# Patient Record
(demographics unavailable — no encounter records)

---

## 2025-01-31 NOTE — REASON FOR VISIT
[Follow-Up Visit] : a follow-up visit for [Neutropenia] : neutropenia [Mother] : mother [Family Member] : family member [Medical Records] : medical records

## 2025-02-02 NOTE — PHYSICAL EXAM
[Tonsils Hypertrophic] : tonsils hypertrophic [No focal deficits] : no focal deficits [Normal] : affect appropriate [de-identified] : wearing glasses [de-identified] : brisk CR, 2+ peripheral puleses [100: Fully active, normal.] : 100: Fully active, normal.

## 2025-02-02 NOTE — CONSULT LETTER
[Dear  ___] : Dear ~HORTENSIA, [Courtesy Letter:] : I had the pleasure of seeing your patient, [unfilled], in my office today. [Please see my note below.] : Please see my note below. [Consult Closing:] : Thank you very much for allowing me to participate in the care of this patient.  If you have any questions, please do not hesitate to contact me. [Sincerely,] : Sincerely, [FreeTextEntry2] : Dania Haddad NP [address] [place] [id number] Phone: [phone] Fax: [phone] [FreeTextEntry3] : Meenakshi Mirza MD, MPH, FAAP Attending Physician [facility] Hematology /Oncology and Cellular Therapy  of Pediatrics [employer] at [facility]

## 2025-02-02 NOTE — CONSULT LETTER
[Dear  ___] : Dear ~HORTENSIA, [Courtesy Letter:] : I had the pleasure of seeing your patient, [unfilled], in my office today. [Please see my note below.] : Please see my note below. [Consult Closing:] : Thank you very much for allowing me to participate in the care of this patient.  If you have any questions, please do not hesitate to contact me. [Sincerely,] : Sincerely, [FreeTextEntry2] : Dania Haddad NP [address] [place] [address] Phone: [phone] Fax: [phone] [FreeTextEntry3] : Meenakshi Mirza MD, MPH, FAAP Attending Physician [facility] Hematology /Oncology and Cellular Therapy  of Pediatrics [employer] at [facility]

## 2025-02-02 NOTE — HISTORY OF PRESENT ILLNESS
[No Feeding Issues] : no feeding issues at this time [de-identified] : We met Don as a 4 year old male with no significant PMH who was found to have neutropenia on a routine CBC on August, 17th 2015. His total white blood cell count (WBC) was 4,300/uL, and absolute neutrophil count (ANC) was 37 /uL indicating profound neutropenia. A repeat CBC on August, 21st Showed similar results. On his presentation to our hematology clinic, his CBC was consistent with neutropenia but the rest of the CBC verna including hemoglobin, platelet count, and reticulocyte percentage were all within normal limits.  Don's mother denied any recent symptoms suggesting a viral infection. She also denied any severe illnesses including pneumonias, meningitis, recurrent skin infections, abscesses or hospitalizations requiring intravenous antibiotics that usually seen with patient with congenital neutropenia. He has had a normal appetite, activity level and has been growing well with appropriate development for age. 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 Good respond to Neupogen with ANC up from 0 to 2500/uL after 1 dose once admitted for febrile neutropenia.  Lost to f/u 3/2017 to 7/2018. Neutropenia recurred in 8/2022 and was severe, prompting work up including gene testing for ELANE which was normal. 1/31/2023: [employer] additional testing results was positive for some VUSs, two of them being LYST genes which are associated with Chediak Higashi.  Also had a gene which causes gallstones (ABCG8). Had a febrile illness 7/15/2023 after returning to [place]. Taken to local ED which did not give empiric antibiotics. Mom therefore brought him back to our ED on 7/16/2023. BCx sent, given empiric Ceftriaxone.  RVP was positive for Rhino/Enterovirus. ANC was 5000 with 16% bands. Throat culture revealed Strep.  Completed a course of Amoxicillin. Mom thinks the elevated ANC was due to the Protandim she administered (gave 2 full tabs, usually takes half a tab).  Of note, Don's aunt (mom's sister) has a P53 mutation and his cousin had Toro Sarcoma.  Don's mom's P53 testing is negative.  [de-identified] : ED visit for fever, found to have Mycoplasma Pneumonia 11/2024. Otherwise, no other illness. No admissions since last visit. No recent cold sores. Continues to take Protandim daily supplement.  Takes 1 tab daily.  Has antioxidants.   Also takes Axio drink supplement and Ambrotose nutritional supplements. Also takes a daily elderberry and vitamin c supplement.  Snoring improved, per mom.  School is going well, 5th grade, no concerns.

## 2025-02-02 NOTE — HISTORY OF PRESENT ILLNESS
[No Feeding Issues] : no feeding issues at this time [de-identified] : We met Don as a 4 year old male with no significant PMH who was found to have neutropenia on a routine CBC on August, 17th 2015. His total white blood cell count (WBC) was 4,300/uL, and absolute neutrophil count (ANC) was 37 /uL indicating profound neutropenia. A repeat CBC on August, 21st Showed similar results. On his presentation to our hematology clinic, his CBC was consistent with neutropenia but the rest of the CBC verna including hemoglobin, platelet count, and reticulocyte percentage were all within normal limits.  Don's mother denied any recent symptoms suggesting a viral infection. She also denied any severe illnesses including pneumonias, meningitis, recurrent skin infections, abscesses or hospitalizations requiring intravenous antibiotics that usually seen with patient with congenital neutropenia. He has had a normal appetite, activity level and has been growing well with appropriate development for age. 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 Good respond to Neupogen with ANC up from 0 to 2500/uL after 1 dose once admitted for febrile neutropenia.  Lost to f/u 3/2017 to 7/2018. Neutropenia recurred in 8/2022 and was severe, prompting work up including gene testing for ELANE which was normal. 1/31/2023: [employer] additional testing results was positive for some VUSs, two of them being LYST genes which are associated with Chediak Higashi.  Also had a gene which causes gallstones (ABCG8). Had a febrile illness 7/15/2023 after returning to [place]. Taken to local ED which did not give empiric antibiotics. Mom therefore brought him back to our ED on 7/16/2023. BCx sent, given empiric Ceftriaxone.  RVP was positive for Rhino/Enterovirus. ANC was 5000 with 16% bands. Throat culture revealed Strep.  Completed a course of Amoxicillin. Mom thinks the elevated ANC was due to the Protandim she administered (gave 2 full tabs, usually takes half a tab).  Of note, Don's aunt (mom's sister) has a P53 mutation and his cousin had Toro Sarcoma.  oDn's mom's P53 testing is negative.  [de-identified] : ED visit for fever, found to have Mycoplasma Pneumonia 11/2024. Otherwise, no other illness. No admissions since last visit. No recent cold sores. Continues to take Protandim daily supplement.  Takes 1 tab daily.  Has antioxidants.   Also takes Axio drink supplement and Ambrotose nutritional supplements. Also takes a daily elderberry and vitamin c supplement.  Snoring improved, per mom.  School is going well, 5th grade, no concerns.

## 2025-02-02 NOTE — PHYSICAL EXAM
[Tonsils Hypertrophic] : tonsils hypertrophic [No focal deficits] : no focal deficits [Normal] : affect appropriate [de-identified] : wearing glasses [de-identified] : brisk CR, 2+ peripheral puleses [100: Fully active, normal.] : 100: Fully active, normal.

## 2025-02-02 NOTE — REVIEW OF SYSTEMS
[Immunizations are up to date by report] : Immunizations are up to date by report [Fever] : no fever [Sweating] : no sweating [Fatigue] : no fatigue [Rash] : no rash [Jaundice] : no jaundice [Pallor] : no pallor [Bleeding] : no bleeding [Bruising] : no bruising [Adenopathy] : no adenopathy [Anemia] : no anemia [Frequent Infections] : no frequent infections

## 2025-07-25 NOTE — HISTORY OF PRESENT ILLNESS
[No Feeding Issues] : no feeding issues at this time [de-identified] : We met Don as a 4 year old male with no significant PMH who was found to have neutropenia on a routine CBC on August, 17th 2015. His total white blood cell count (WBC) was 4,300/uL, and absolute neutrophil count (ANC) was 37 /uL indicating profound neutropenia. A repeat CBC on August, 21st Showed similar results. On his presentation to our hematology clinic, his CBC was consistent with neutropenia but the rest of the CBC verna including hemoglobin, platelet count, and reticulocyte percentage were all within normal limits.  Don's mother denied any recent symptoms suggesting a viral infection. She also denied any severe illnesses including pneumonias, meningitis, recurrent skin infections, abscesses or hospitalizations requiring intravenous antibiotics that usually seen with patient with congenital neutropenia. He has had a normal appetite, activity level and has been growing well with appropriate development for age. 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 Good respond to Neupogen with ANC up from 0 to 2500/uL after 1 dose once admitted for febrile neutropenia.  Lost to f/u 3/2017 to 7/2018. Neutropenia recurred in 8/2022 and was severe, prompting work up including gene testing for ELANE which was normal. 1/31/2023: [employer] additional testing results was positive for some VUSs, two of them being LYST genes which are associated with Chediak Higashi.  Also had a gene which causes gallstones (ABCG8). Had a febrile illness 7/15/2023 after returning to [place]. Taken to local ED which did not give empiric antibiotics. Mom therefore brought him back to our ED on 7/16/2023. BCx sent, given empiric Ceftriaxone.  RVP was positive for Rhino/Enterovirus. ANC was 5000 with 16% bands. Throat culture revealed Strep.  Completed a course of Amoxicillin. Mom thinks the elevated ANC was due to the Protandim she administered (gave 2 full tabs, usually takes half a tab). ED visit for fever, found to have Mycoplasma Pneumonia 11/2024.  Of note, Don's aunt (mom's sister) has a P53 mutation and his cousin had Toro Sarcoma.  Don's mom's P53 testing is negative.  [de-identified] : Doing well. Afebrile. No recent illness. No ED visits or admissions since last visit.  No recent cold sores. Continues to take Protandim daily supplement.  Takes 1 tab daily.  Has antioxidants.   Also takes Axio drink supplement and Ambrotose nutritional supplements. Also takes a daily elderberry and vitamin c supplement.  Intermittent snoring. Will be travelling to [facility] next month.   School went well, going to 6th grade, no concerns. Performs on an 8th grade level per testing according to mom.

## 2025-07-25 NOTE — CONSULT LETTER
[Dear  ___] : Dear ~HORTENSIA, [Courtesy Letter:] : I had the pleasure of seeing your patient, [unfilled], in my office today. [Please see my note below.] : Please see my note below. [Consult Closing:] : Thank you very much for allowing me to participate in the care of this patient.  If you have any questions, please do not hesitate to contact me. [Sincerely,] : Sincerely, [FreeTextEntry2] : [facility] [address], [place] [address] phone: [phone] fax: [phone] [FreeTextEntry3] : Meenakshi Mirza MD, MPH, FAAP Attending Physician [facility] Hematology /Oncology and Cellular Therapy  of Pediatrics [employer] at [facility]

## 2025-07-25 NOTE — PHYSICAL EXAM
[Tonsils Hypertrophic] : tonsils hypertrophic [No focal deficits] : no focal deficits [Normal] : affect appropriate [100: Fully active, normal.] : 100: Fully active, normal. [de-identified] : not wearing glasses (broken) [de-identified] : brisk CR, 2+ peripheral puleses